# Patient Record
Sex: FEMALE | Race: WHITE | NOT HISPANIC OR LATINO | Employment: OTHER | URBAN - METROPOLITAN AREA
[De-identification: names, ages, dates, MRNs, and addresses within clinical notes are randomized per-mention and may not be internally consistent; named-entity substitution may affect disease eponyms.]

---

## 2017-06-29 ENCOUNTER — CONVERSION ENCOUNTER (OUTPATIENT)
Dept: MAMMOGRAPHY | Facility: HOSPITAL | Age: 62
End: 2017-06-29

## 2018-08-10 ENCOUNTER — CONVERSION ENCOUNTER (OUTPATIENT)
Dept: MAMMOGRAPHY | Facility: HOSPITAL | Age: 63
End: 2018-08-10

## 2021-06-01 ENCOUNTER — TRANSCRIBE ORDERS (OUTPATIENT)
Dept: ADMINISTRATIVE | Facility: HOSPITAL | Age: 66
End: 2021-06-01

## 2021-06-01 DIAGNOSIS — Z12.31 VISIT FOR SCREENING MAMMOGRAM: Primary | ICD-10-CM

## 2021-06-16 ENCOUNTER — HOSPITAL ENCOUNTER (OUTPATIENT)
Dept: MAMMOGRAPHY | Facility: HOSPITAL | Age: 66
Discharge: HOME | End: 2021-06-16
Admitting: NURSE PRACTITIONER

## 2021-06-16 DIAGNOSIS — Z12.31 VISIT FOR SCREENING MAMMOGRAM: ICD-10-CM

## 2021-06-16 PROCEDURE — 77067 SCR MAMMO BI INCL CAD: CPT

## 2021-06-16 PROCEDURE — 77063 BREAST TOMOSYNTHESIS BI: CPT

## 2021-08-10 ENCOUNTER — PREP FOR SURGERY (OUTPATIENT)
Dept: OTHER | Facility: HOSPITAL | Age: 66
End: 2021-08-10

## 2021-08-10 ENCOUNTER — OFFICE VISIT (OUTPATIENT)
Dept: GASTROENTEROLOGY | Facility: CLINIC | Age: 66
End: 2021-08-10

## 2021-08-10 VITALS
BODY MASS INDEX: 28.81 KG/M2 | SYSTOLIC BLOOD PRESSURE: 138 MMHG | HEART RATE: 95 BPM | HEIGHT: 63 IN | WEIGHT: 162.6 LBS | DIASTOLIC BLOOD PRESSURE: 56 MMHG

## 2021-08-10 DIAGNOSIS — D64.9 ANEMIA, UNSPECIFIED TYPE: ICD-10-CM

## 2021-08-10 DIAGNOSIS — E87.6 HYPOKALEMIA: ICD-10-CM

## 2021-08-10 DIAGNOSIS — R19.5 POSITIVE COLORECTAL CANCER SCREENING USING COLOGUARD TEST: Primary | ICD-10-CM

## 2021-08-10 PROCEDURE — 99204 OFFICE O/P NEW MOD 45 MIN: CPT | Performed by: NURSE PRACTITIONER

## 2021-08-10 RX ORDER — CYANOCOBALAMIN 1000 UG/ML
INJECTION, SOLUTION INTRAMUSCULAR; SUBCUTANEOUS
COMMUNITY
Start: 2021-06-09

## 2021-08-10 RX ORDER — OMEPRAZOLE 20 MG/1
20 CAPSULE, DELAYED RELEASE ORAL DAILY
COMMUNITY
Start: 2021-07-12

## 2021-08-10 RX ORDER — FERROUS SULFATE 325(65) MG
1 TABLET ORAL 2 TIMES DAILY
COMMUNITY
Start: 2021-06-09

## 2021-08-10 RX ORDER — RIVAROXABAN 2.5 MG/1
2.5 TABLET, FILM COATED ORAL 2 TIMES DAILY
COMMUNITY
Start: 2021-06-16

## 2021-08-10 RX ORDER — BUDESONIDE, GLYCOPYRROLATE, AND FORMOTEROL FUMARATE 160; 9; 4.8 UG/1; UG/1; UG/1
AEROSOL, METERED RESPIRATORY (INHALATION)
COMMUNITY
Start: 2021-06-12

## 2021-08-10 RX ORDER — LOSARTAN POTASSIUM 50 MG/1
TABLET ORAL
COMMUNITY
Start: 2021-05-21

## 2021-08-10 RX ORDER — ATORVASTATIN CALCIUM 80 MG/1
TABLET, FILM COATED ORAL
COMMUNITY
Start: 2021-08-06

## 2021-08-10 NOTE — PROGRESS NOTES
Patient Name: Nat Estrada   Visit Date: 08/10/2021   Patient ID: [id number]  Provider: GISSEL Pitts    Sex: female  Location:  Location Address:  Location Phone: [address] [place] [address] [phone]    YOB: 1955  Age: 66 y.o.   Primary Care Provider John Boyd Jr., MD      Referring Provider: GISSEL Delgadillo        Chief Complaint  +cologuard    History of Present Illness     New pt presents for +cologuard ordered by PCP. Pt states she has hx of pernicious anemia. Pt has rec'd 1 unit of blood in June, with Dr Pal.  Pt has no c/o abd pain, diarrhea, blood in stool, black stool. No unint wt loss. States Hgb back up to 11 from July labs.   ER visit 5/28/2021 lightheaded and short of breath with walking, INR was 1.1, hemoglobin was 7.3, GFR 54, patient was referred to Dr. Pal.    Stool for occult blood 6/5/2021 was negative    Does not follow w pulmonary, has hx COPD/asthma  Hx PAD--on Plavix and Xarelto (has had femoral bypass), Dr Guido in [place] (pt travels there in the winter)  Pt has not had colonoscopy >10yrs  Past Medical History:   Diagnosis Date   • Anemia 1985   • GERD (gastroesophageal reflux disease) 2021   • Hyperlipidemia 2019   • Hypertension 2020   • Ulcer 2021       Past Surgical History:   Procedure Laterality Date   • UPPER GASTROINTESTINAL ENDOSCOPY  2021         Current Outpatient Medications:   •  atorvastatin (LIPITOR) 80 MG tablet, , Disp: , Rfl:   •  Breztri Aerosphere 160-9-4.8 MCG/ACT aerosol inhaler, INHALE 2 PUFFS BY MOUTH TWICE DAILY IN THE MORNING AND IN THE EVENING, Disp: , Rfl:   •  Clopidogrel Bisulfate (PLAVIX PO), , Disp: , Rfl:   •  cyanocobalamin 1000 MCG/ML injection, INJECT 1 ML IN THE MUSCLE ONCE A MONTH, Disp: , Rfl:   •  FeroSul 325 (65 Fe) MG tablet, Take 1 tablet by mouth 2 (Two) Times a Day., Disp: , Rfl:   •  losartan (COZAAR) 50 MG tablet, , Disp: , Rfl:   •  omeprazole (priLOSEC) 20 MG capsule,  "Take 20 mg by mouth Daily., Disp: , Rfl:   •  Pediatric Multivitamins-Iron (multivitamin chewable) chewable tablet, , Disp: , Rfl:   •  Xarelto 2.5 MG tablet, Take 2.5 mg by mouth 2 (Two) Times a Day., Disp: , Rfl:      No Known Allergies    Family History   Problem Relation Age of Onset   • No Known Problems Mother    • No Known Problems Father    • No Known Problems Maternal Grandmother    • No Known Problems Maternal Grandfather    • No Known Problems Paternal Grandmother    • No Known Problems Paternal Grandfather    • Colon cancer Neg Hx         Social History     Tobacco Use   • Smoking status: Former Smoker   Vaping Use   • Vaping Use: Former   Substance Use Topics   • Alcohol use: Defer   • Drug use: Defer       Objective     Vital Signs:   /56 (BP Location: Left arm, Patient Position: Sitting, Cuff Size: Adult)   Pulse 95   Ht 160 cm (63\")   Wt 73.8 kg (162 lb 9.6 oz)   BMI 28.80 kg/m²       Physical Exam  Constitutional:       General: He is not in acute distress.     Appearance: Normal appearance.   HENT:      Head: Normocephalic and atraumatic.      Nose: Nose normal.   Pulmonary:      Effort: Pulmonary effort is normal. No respiratory distress.   Abdominal:      General: Abdomen is flat.      Palpations: Abdomen is soft. There is no mass.      Tenderness: There is no abdominal tenderness. There is no guarding.   Musculoskeletal:      Cervical back: Neck supple.      Right lower leg: No edema.      Left lower leg: No edema.   Skin:     General: Skin is warm and dry.   Neurological:      General: No focal deficit present.      Mental Status: He is alert and oriented to person, place, and time.      Gait: Gait normal.   Psychiatric:         Mood and Affect: Mood normal.         Speech: Speech normal.         Behavior: Behavior normal.         Thought Content: Thought content normal.     Result Review :   The following data was reviewed by: GISSEL Pitts on 08/10/2021:  CMP    CMP " 5/28/21   Glucose 152 (A)   BUN 5   Creatinine 1.07 (A)   Sodium 137   Potassium 2.6 (A)   Chloride 100   Calcium 8.5 (A)   Albumin 4.1   Total Bilirubin 0.51   Alkaline Phosphatase 123   AST (SGOT) 12 (A)   ALT (SGPT) 7 (A)   (A) Abnormal value            CBC    CBC 5/28/21   WBC 7.27   RBC 3.63 (A)   Hemoglobin 7.3 (A)   Hematocrit 26.2 (A)   MCV 72.2 (A)   MCH 20.1 (A)   MCHC 27.9 (A)   RDW 19.2 (A)   Platelets 352   (A) Abnormal value                      Assessment and Plan    Diagnoses and all orders for this visit:    1. Positive colorectal cancer screening using Cologuard test (Primary)    2. Anemia, unspecified type  -     CBC (No Diff); Future  -     Basic Metabolic Panel; Future    3. Hypokalemia  -     CBC (No Diff); Future  -     Basic Metabolic Panel; Future            Follow Up      Colonoscopy Surgical Risk and Benefits: Possible risks/complications, benefits, and alternatives to surgical or invasive procedure have been explained to patient and/or legal guardian; Patient has been evaluated and can tolerate anesthesia and/or sedation. Risks, benefits, and alternatives to anesthesia and sedation have been explained to patient and/or legal guardian.   Clearance Dr. Guido [address] [place] [address] Dr. Hitchcock [place]  Patient stated she cannot do the 4 L prep, I reviewed with her with GFR 54 and low potassium history that this was the safest prep for her, we discussed the risk of electrolyte imbalance and dehydration that can occur through colonoscopy prep.  Patient verbalized understanding but she stated that she absolutely could not drink that due to the taste.  Patient states she has had repeat labs and her potassium is now normal.  We then discussed a lower volume prep such as Plenvu, patient was agreeable to try this and I stressed the importance of drinking additional fluids with this prep.  Patient verbalized understanding.  We will also recheck labs and ensure potassium is sustaining normal and that kidney  function is normal.    Patient was given instructions and counseling regarding her condition or for health maintenance advice. Please see specific information pulled into the AVS if appropriate.

## 2022-06-21 ENCOUNTER — TRANSCRIBE ORDERS (OUTPATIENT)
Dept: ADMINISTRATIVE | Facility: HOSPITAL | Age: 67
End: 2022-06-21

## 2022-06-21 DIAGNOSIS — Z12.31 SCREENING MAMMOGRAM, ENCOUNTER FOR: Primary | ICD-10-CM

## 2022-07-20 ENCOUNTER — HOSPITAL ENCOUNTER (OUTPATIENT)
Dept: MAMMOGRAPHY | Facility: HOSPITAL | Age: 67
Discharge: HOME | End: 2022-07-20
Admitting: NURSE PRACTITIONER

## 2022-07-20 DIAGNOSIS — Z12.31 SCREENING MAMMOGRAM, ENCOUNTER FOR: ICD-10-CM

## 2022-07-20 PROCEDURE — 77067 SCR MAMMO BI INCL CAD: CPT

## 2022-07-20 PROCEDURE — 77063 BREAST TOMOSYNTHESIS BI: CPT

## 2023-06-01 ENCOUNTER — TRANSCRIBE ORDERS (OUTPATIENT)
Dept: ADMINISTRATIVE | Facility: HOSPITAL | Age: 68
End: 2023-06-01
Payer: MEDICARE

## 2023-06-01 DIAGNOSIS — Z12.31 ENCOUNTER FOR SCREENING MAMMOGRAM FOR MALIGNANT NEOPLASM OF BREAST: Primary | ICD-10-CM

## 2023-07-27 ENCOUNTER — HOSPITAL ENCOUNTER (OUTPATIENT)
Dept: MAMMOGRAPHY | Facility: HOSPITAL | Age: 68
Discharge: HOME | End: 2023-07-27
Admitting: NURSE PRACTITIONER
Payer: MEDICARE

## 2023-07-27 DIAGNOSIS — Z12.31 ENCOUNTER FOR SCREENING MAMMOGRAM FOR MALIGNANT NEOPLASM OF BREAST: ICD-10-CM

## 2023-07-27 PROCEDURE — 77067 SCR MAMMO BI INCL CAD: CPT

## 2023-07-27 PROCEDURE — 77063 BREAST TOMOSYNTHESIS BI: CPT

## 2024-02-29 ENCOUNTER — TELEPHONE (OUTPATIENT)
Dept: OBSTETRICS AND GYNECOLOGY | Facility: CLINIC | Age: 69
End: 2024-02-29
Payer: MEDICARE

## 2024-02-29 NOTE — TELEPHONE ENCOUNTER
Caller: Nat Estrada    Relationship: Self    Best call back number: [phone]      Who are you requesting to speak with (clinical staff, DR. NICHOLAS      What was the call regarding: PATIENT HAS A PAPER COPY OF A REFERRAL TO DR. NICHOLAS FOR A UTERINE MASS. THE EARLIEST DATE HUB HAD WAS 5/22/24, PATIENT WOULD LIKE TO BE SEEN SOONER.

## 2024-03-01 NOTE — TELEPHONE ENCOUNTER
Left message for patient. Does she have copies of any imaging that has been done? If so, we would need a copy of the imaging.

## 2024-03-04 NOTE — TELEPHONE ENCOUNTER
HUB TO RELAY    Patient is scheduled for a new gyn appointment on 03-21-24 with . Please document if you inform patient of this.

## 2024-03-04 NOTE — TELEPHONE ENCOUNTER
Caller: Nat Estrada    Relationship: Self    Best call back number: [phone]    Do you know the name of the person who called: CAT    What was the call regarding: PATIENT CALLED BACK AND HUB RELAYED APPOINTMENT INFORMATION FOR 3/21/24 WITH DR. ROWE. NO CALL BACK NEEDED.

## 2024-04-02 ENCOUNTER — TELEPHONE (OUTPATIENT)
Dept: OBSTETRICS AND GYNECOLOGY | Facility: CLINIC | Age: 69
End: 2024-04-02
Payer: MEDICARE

## 2024-06-26 ENCOUNTER — OFFICE VISIT (OUTPATIENT)
Dept: OBSTETRICS AND GYNECOLOGY | Facility: CLINIC | Age: 69
End: 2024-06-26
Payer: MEDICARE

## 2024-06-26 ENCOUNTER — TRANSCRIBE ORDERS (OUTPATIENT)
Dept: ADMINISTRATIVE | Facility: HOSPITAL | Age: 69
End: 2024-06-26
Payer: MEDICARE

## 2024-06-26 VITALS
BODY MASS INDEX: 25.34 KG/M2 | WEIGHT: 143 LBS | SYSTOLIC BLOOD PRESSURE: 122 MMHG | HEIGHT: 63 IN | DIASTOLIC BLOOD PRESSURE: 83 MMHG | HEART RATE: 86 BPM

## 2024-06-26 DIAGNOSIS — R19.00 PELVIC MASS: Primary | ICD-10-CM

## 2024-06-26 DIAGNOSIS — Z12.79 ENCOUNTER FOR SCREENING FOR MALIGNANT NEOPLASM OF OTHER GENITOURINARY ORGANS: ICD-10-CM

## 2024-06-26 DIAGNOSIS — Z12.31 BREAST CANCER SCREENING BY MAMMOGRAM: Primary | ICD-10-CM

## 2024-06-26 PROBLEM — I10 ESSENTIAL HYPERTENSION: Status: ACTIVE | Noted: 2024-06-26

## 2024-06-26 PROBLEM — K21.9 GASTROESOPHAGEAL REFLUX DISEASE: Status: ACTIVE | Noted: 2024-06-26

## 2024-06-26 PROBLEM — E78.00 PURE HYPERCHOLESTEROLEMIA: Status: ACTIVE | Noted: 2024-06-26

## 2024-06-26 PROBLEM — J44.9 CHRONIC OBSTRUCTIVE PULMONARY DISEASE: Status: ACTIVE | Noted: 2024-06-26

## 2024-06-26 PROBLEM — D50.0 IRON DEFICIENCY ANEMIA DUE TO CHRONIC BLOOD LOSS: Status: ACTIVE | Noted: 2024-06-26

## 2024-06-26 PROBLEM — I61.0 BASAL GANGLIA HEMORRHAGE: Status: ACTIVE | Noted: 2024-06-26

## 2024-06-26 PROBLEM — I73.9 PERIPHERAL ARTERY DISEASE: Status: ACTIVE | Noted: 2024-06-26

## 2024-06-26 PROCEDURE — 3079F DIAST BP 80-89 MM HG: CPT | Performed by: OBSTETRICS & GYNECOLOGY

## 2024-06-26 PROCEDURE — 3074F SYST BP LT 130 MM HG: CPT | Performed by: OBSTETRICS & GYNECOLOGY

## 2024-06-26 PROCEDURE — G0123 SCREEN CERV/VAG THIN LAYER: HCPCS | Performed by: OBSTETRICS & GYNECOLOGY

## 2024-06-26 PROCEDURE — 1160F RVW MEDS BY RX/DR IN RCRD: CPT | Performed by: OBSTETRICS & GYNECOLOGY

## 2024-06-26 PROCEDURE — 1159F MED LIST DOCD IN RCRD: CPT | Performed by: OBSTETRICS & GYNECOLOGY

## 2024-06-26 PROCEDURE — 99459 PELVIC EXAMINATION: CPT | Performed by: OBSTETRICS & GYNECOLOGY

## 2024-06-26 PROCEDURE — 99203 OFFICE O/P NEW LOW 30 MIN: CPT | Performed by: OBSTETRICS & GYNECOLOGY

## 2024-06-26 RX ORDER — BUSPIRONE HYDROCHLORIDE 5 MG/1
5 TABLET ORAL 2 TIMES DAILY
COMMUNITY
Start: 2024-05-10
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
## 2024-06-29 LAB
CONV .: NORMAL
CYTOLOGIST CVX/VAG CYTO: NORMAL
CYTOLOGY CVX/VAG DOC CYTO: NORMAL
CYTOLOGY CVX/VAG DOC THIN PREP: NORMAL
DX ICD CODE: NORMAL
Lab: NORMAL
OTHER STN SPEC: NORMAL
STAT OF ADQ CVX/VAG CYTO-IMP: NORMAL

## 2024-07-01 PROBLEM — R19.00 PELVIC MASS: Status: ACTIVE | Noted: 2024-07-01
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
## 2024-07-15 ENCOUNTER — OFFICE VISIT (OUTPATIENT)
Dept: OBSTETRICS AND GYNECOLOGY | Facility: CLINIC | Age: 69
End: 2024-07-15
Payer: MEDICARE

## 2024-07-15 VITALS — DIASTOLIC BLOOD PRESSURE: 78 MMHG | WEIGHT: 145 LBS | SYSTOLIC BLOOD PRESSURE: 128 MMHG | BODY MASS INDEX: 25.69 KG/M2

## 2024-07-15 DIAGNOSIS — R19.00 PELVIC MASS: Primary | ICD-10-CM

## 2024-07-15 PROCEDURE — 99213 OFFICE O/P EST LOW 20 MIN: CPT | Performed by: OBSTETRICS & GYNECOLOGY

## 2024-07-15 PROCEDURE — 3078F DIAST BP <80 MM HG: CPT | Performed by: OBSTETRICS & GYNECOLOGY

## 2024-07-15 PROCEDURE — 1160F RVW MEDS BY RX/DR IN RCRD: CPT | Performed by: OBSTETRICS & GYNECOLOGY

## 2024-07-15 PROCEDURE — 1159F MED LIST DOCD IN RCRD: CPT | Performed by: OBSTETRICS & GYNECOLOGY

## 2024-07-15 PROCEDURE — 3074F SYST BP LT 130 MM HG: CPT | Performed by: OBSTETRICS & GYNECOLOGY
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
## 2024-07-15 NOTE — ASSESSMENT & PLAN NOTE
Today's ultrasound was read reviewed.  There is a 6.46 cm right adnexal mass that is hypoechoic.  It is unclear if this is part of the right ovary, part of the uterus or an adjacent structure.  It is essentially stable in size compared to the previous studies, or slightly reduced in size.  This along with a normal  certainly suggest a benign etiology, however the etiology is mass is still unclear.  To help further delineate the etiology of this mass I have recommended a pelvic MRI for better visualization.  If the patient develops any pelvic pain I recommend OTC NSAIDs.  If this does not control the patient's pain she should notify me ASAP.  After the patient's MRI, I will call her with the results and then decide on the next steps.

## 2024-07-19 ENCOUNTER — TELEPHONE (OUTPATIENT)
Dept: OBSTETRICS AND GYNECOLOGY | Facility: CLINIC | Age: 69
End: 2024-07-19
Payer: MEDICARE

## 2024-07-19 NOTE — TELEPHONE ENCOUNTER
The patient called because she said she had not been scheduled for her MRI yet.  I explained that there is an outage affecting Epic and I was unable to look up her information at the time.  I gave her the number to Central Scheduling and told her to check back with us next week if she has any issues or she has not been called to make an appointment by then.

## 2024-07-29 ENCOUNTER — HOSPITAL ENCOUNTER (OUTPATIENT)
Dept: MAMMOGRAPHY | Facility: HOSPITAL | Age: 69
Discharge: HOME | End: 2024-07-29
Admitting: FAMILY MEDICINE
Payer: MEDICARE

## 2024-07-29 DIAGNOSIS — Z12.31 BREAST CANCER SCREENING BY MAMMOGRAM: ICD-10-CM

## 2024-07-29 PROCEDURE — 77067 SCR MAMMO BI INCL CAD: CPT

## 2024-07-29 PROCEDURE — 77063 BREAST TOMOSYNTHESIS BI: CPT

## 2024-09-10 ENCOUNTER — TELEPHONE (OUTPATIENT)
Dept: OBSTETRICS AND GYNECOLOGY | Facility: CLINIC | Age: 69
End: 2024-09-10
Payer: MEDICARE

## 2024-09-12 ENCOUNTER — HOSPITAL ENCOUNTER (OUTPATIENT)
Dept: MRI IMAGING | Facility: HOSPITAL | Age: 69
Discharge: HOME | End: 2024-09-12
Admitting: OBSTETRICS & GYNECOLOGY
Payer: MEDICARE

## 2024-09-12 DIAGNOSIS — R19.00 PELVIC MASS: ICD-10-CM

## 2024-09-12 LAB
CREAT BLDA-MCNC: 0.9 MG/DL (ref 0.6–1.3)
EGFRCR SERPLBLD CKD-EPI 2021: 69.8 ML/MIN/1.73

## 2024-09-12 PROCEDURE — 0 GADOBENATE DIMEGLUMINE 529 MG/ML SOLUTION: Performed by: OBSTETRICS & GYNECOLOGY

## 2024-09-12 PROCEDURE — A9577 INJ MULTIHANCE: HCPCS | Performed by: OBSTETRICS & GYNECOLOGY

## 2024-09-12 PROCEDURE — 82565 ASSAY OF CREATININE: CPT

## 2024-09-12 PROCEDURE — 72197 MRI PELVIS W/O & W/DYE: CPT

## 2024-09-12 RX ADMIN — GADOBENATE DIMEGLUMINE 14 ML: 529 INJECTION, SOLUTION INTRAVENOUS at 10:35

## 2024-09-13 DIAGNOSIS — N94.89 ADNEXAL MASS: Primary | ICD-10-CM

## 2024-09-13 NOTE — PROGRESS NOTES
I spoke with the patient via telephone today in regards to her MRI results.  The MRI showing an 8 cm solid-appearing right adnexal mass.  Given her postmenopausal age and the nature of the mass I recommended consultation with gynecologic oncology.  We discussed there is a risk that this is a cancer.  This is why I would recommend gynecologic oncology evaluate the patient.  We discussed that there is a high likelihood they will recommend surgical removal so that definitive pathology can be obtained to determine whether this is cancerous or benign.  The patient is understanding.  All of her questions were answered.  She like to move forward with consultation.  A referral has been placed for [facility] onc[facility].